# Patient Record
Sex: FEMALE | Race: OTHER | HISPANIC OR LATINO | URBAN - METROPOLITAN AREA
[De-identification: names, ages, dates, MRNs, and addresses within clinical notes are randomized per-mention and may not be internally consistent; named-entity substitution may affect disease eponyms.]

---

## 2017-12-22 ENCOUNTER — EMERGENCY (EMERGENCY)
Facility: HOSPITAL | Age: 22
LOS: 1 days | Discharge: HOME | End: 2017-12-22
Attending: EMERGENCY MEDICINE | Admitting: EMERGENCY MEDICINE
Payer: COMMERCIAL

## 2017-12-22 VITALS
TEMPERATURE: 98 F | HEART RATE: 106 BPM | OXYGEN SATURATION: 99 % | SYSTOLIC BLOOD PRESSURE: 123 MMHG | RESPIRATION RATE: 16 BRPM | DIASTOLIC BLOOD PRESSURE: 85 MMHG

## 2017-12-22 PROCEDURE — 99284 EMERGENCY DEPT VISIT MOD MDM: CPT

## 2017-12-22 PROCEDURE — 99283 EMERGENCY DEPT VISIT LOW MDM: CPT

## 2017-12-22 RX ORDER — IBUPROFEN 200 MG
800 TABLET ORAL ONCE
Qty: 0 | Refills: 0 | Status: COMPLETED | OUTPATIENT
Start: 2017-12-22 | End: 2017-12-22

## 2017-12-22 RX ORDER — CYCLOBENZAPRINE HYDROCHLORIDE 10 MG/1
10 TABLET, FILM COATED ORAL ONCE
Qty: 0 | Refills: 0 | Status: COMPLETED | OUTPATIENT
Start: 2017-12-22 | End: 2017-12-22

## 2017-12-22 RX ORDER — CYCLOBENZAPRINE HYDROCHLORIDE 10 MG/1
1 TABLET, FILM COATED ORAL
Qty: 10 | Refills: 0 | OUTPATIENT
Start: 2017-12-22 | End: 2017-12-26

## 2017-12-22 RX ORDER — IBUPROFEN 200 MG
1 TABLET ORAL
Qty: 20 | Refills: 0 | OUTPATIENT
Start: 2017-12-22 | End: 2017-12-26

## 2017-12-22 RX ADMIN — Medication 800 MILLIGRAM(S): at 15:57

## 2017-12-22 RX ADMIN — CYCLOBENZAPRINE HYDROCHLORIDE 10 MILLIGRAM(S): 10 TABLET, FILM COATED ORAL at 15:57

## 2017-12-22 NOTE — ED STATDOCS - NEUROLOGICAL, MLM
CN II-XII intact. No drift. RAHM intact. Normal strength of upper extremities. No pronator drift CN II-XII intact. No drift. RAHM intact. Normal strength of upper extremities. No pronator drift. Patellar reflexes 2+
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
## 2017-12-22 NOTE — ED STATDOCS - MUSCULOSKELETAL, MLM
Pain on left para lumbar around L4. Neck supple, FROM. Spine is midline. No calf tenderness. No C-spine tenderness. Pelvis is intact. no long bone deformity
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
## 2017-12-22 NOTE — ED STATDOCS - CARDIAC, MLM
S1, S2, regular rate. Chest wall moves symmetrically. Radial pulses 2+ b/l Brachio radial 2+, patellar reflexes 2+ S1, S2, regular rate. Chest wall moves symmetrically. Radial pulses 2+ b/l, Brachioradialis 2+

## 2019-02-18 NOTE — ED STATDOCS - DISCHARGE DATE
Dr Christopher Sandoval- I read your note which recommended [employer]  I believe [employer] would be an okay substitute  Please let me know  Thanks! 22-Dec-2017